# Patient Record
Sex: MALE | Race: WHITE | Employment: FULL TIME | URBAN - METROPOLITAN AREA
[De-identification: names, ages, dates, MRNs, and addresses within clinical notes are randomized per-mention and may not be internally consistent; named-entity substitution may affect disease eponyms.]

---

## 2018-06-15 ENCOUNTER — TRANSFERRED RECORDS (OUTPATIENT)
Dept: HEALTH INFORMATION MANAGEMENT | Facility: CLINIC | Age: 48
End: 2018-06-15

## 2019-01-15 ENCOUNTER — TRANSFERRED RECORDS (OUTPATIENT)
Dept: HEALTH INFORMATION MANAGEMENT | Facility: CLINIC | Age: 49
End: 2019-01-15

## 2019-02-05 ENCOUNTER — DOCUMENTATION ONLY (OUTPATIENT)
Dept: CARE COORDINATION | Facility: CLINIC | Age: 49
End: 2019-02-05

## 2019-02-22 NOTE — TELEPHONE ENCOUNTER
RECORDS RECEIVED FROM: Anderson Kovacs referring for SI joint pain, L side. Per Karolina - Xray 1/15/19, MRI Summer 2018 ([facility] - Pt also has disc he can hand-carry). Appt per Pt.   DATE RECEIVED: 02/22/19    NOTES STATUS DETAILS   OFFICE NOTE from referring provider Received Dr. Kovacs 1/15/19   OFFICE NOTE from other specialist N/A    DISCHARGE SUMMARY from hospital N/A    DISCHARGE REPORT from the ER N/A    OPERATIVE REPORT N/A    MEDICATION LIST Received    IMPLANT RECORD/STICKER N/A    LABS     CBC/DIFF N/A    CULTURES N/A    INJECTIONS DONE IN RADIOLOGY REceived 1/18/19   MRI In process Summer 2018, hand carry   CT SCAN N/A    XRAYS (IMAGES & REPORTS) Received 1/15/19   TUMOR     PATHOLOGY  Slides & report N/A      02/22/19  1:13 PM faxed records request to [facility] [phone]    02/25/19  2:16 PM Called [facility], [facility], called [facility]. [facility] Gr[facility] is pushing xray

## 2019-02-26 ENCOUNTER — PRE VISIT (OUTPATIENT)
Dept: ORTHOPEDICS | Facility: CLINIC | Age: 49
End: 2019-02-26

## 2019-03-22 DIAGNOSIS — M54.50 LOW BACK PAIN: Primary | ICD-10-CM

## 2019-03-22 ASSESSMENT — ENCOUNTER SYMPTOMS
HEADACHES: 1
WEAKNESS: 1
ORTHOPNEA: 0
SPEECH CHANGE: 0
MUSCLE WEAKNESS: 1
ALTERED TEMPERATURE REGULATION: 1
DECREASED APPETITE: 0
NECK PAIN: 0
SEIZURES: 0
STIFFNESS: 1
INCREASED ENERGY: 1
HYPOTENSION: 0
MEMORY LOSS: 1
LIGHT-HEADEDNESS: 0
DISTURBANCES IN COORDINATION: 0
JOINT SWELLING: 0
WEIGHT LOSS: 0
CHILLS: 0
WEIGHT GAIN: 1
NUMBNESS: 1
SYNCOPE: 0
POLYDIPSIA: 1
HYPERTENSION: 1
POLYPHAGIA: 1
TINGLING: 1
HALLUCINATIONS: 0
TREMORS: 0
SLEEP DISTURBANCES DUE TO BREATHING: 0
DIZZINESS: 0
MUSCLE CRAMPS: 0
FATIGUE: 1
LOSS OF CONSCIOUSNESS: 0
EXERCISE INTOLERANCE: 1
FEVER: 0
PALPITATIONS: 0
LEG PAIN: 1
MYALGIAS: 0
ARTHRALGIAS: 1
NIGHT SWEATS: 1
PARALYSIS: 0
BACK PAIN: 1

## 2019-03-26 ENCOUNTER — OFFICE VISIT (OUTPATIENT)
Dept: ORTHOPEDICS | Facility: CLINIC | Age: 49
End: 2019-03-26
Payer: COMMERCIAL

## 2019-03-26 ENCOUNTER — ANCILLARY PROCEDURE (OUTPATIENT)
Dept: CT IMAGING | Facility: CLINIC | Age: 49
End: 2019-03-26
Attending: PHYSICIAN ASSISTANT
Payer: COMMERCIAL

## 2019-03-26 ENCOUNTER — ANCILLARY PROCEDURE (OUTPATIENT)
Dept: GENERAL RADIOLOGY | Facility: CLINIC | Age: 49
End: 2019-03-26
Attending: ORTHOPAEDIC SURGERY
Payer: COMMERCIAL

## 2019-03-26 VITALS — WEIGHT: 315 LBS | HEIGHT: 72 IN | BODY MASS INDEX: 42.66 KG/M2

## 2019-03-26 DIAGNOSIS — M43.16 SPONDYLOLISTHESIS OF LUMBAR REGION: ICD-10-CM

## 2019-03-26 DIAGNOSIS — M54.50 LOW BACK PAIN: ICD-10-CM

## 2019-03-26 DIAGNOSIS — M53.3 SI (SACROILIAC) JOINT DYSFUNCTION: ICD-10-CM

## 2019-03-26 DIAGNOSIS — M53.3 SI (SACROILIAC) JOINT DYSFUNCTION: Primary | ICD-10-CM

## 2019-03-26 RX ORDER — LISINOPRIL 20 MG/1
TABLET ORAL
COMMUNITY
Start: 2019-01-30

## 2019-03-26 RX ORDER — BUPROPION HYDROCHLORIDE 150 MG/1
TABLET, EXTENDED RELEASE ORAL
COMMUNITY
Start: 2014-09-10

## 2019-03-26 RX ORDER — DULOXETIN HYDROCHLORIDE 60 MG/1
CAPSULE, DELAYED RELEASE ORAL
COMMUNITY
Start: 2009-10-21

## 2019-03-26 RX ORDER — PRAMIPEXOLE DIHYDROCHLORIDE 0.5 MG/1
TABLET ORAL
COMMUNITY
Start: 2012-04-26

## 2019-03-26 ASSESSMENT — MIFFLIN-ST. JEOR: SCORE: 2358.58

## 2019-03-26 NOTE — PROGRESS NOTES
REASON FOR CONSULTATION: Consult (SI joint pain)     REFERRING PHYSICIAN: Anderson Kovacs  PCP:Amee Dickerson    History of Present Illness: Patient presents today for evaluation of left sacroiliac joint pain.  He has been seeing Dr. Kovacs for workup of low back and SI joint pain.  He did have medial branch blocks and radiofrequency ablation at L4-5 which was very helpful for about 5 weeks.  He also had a fluoroscopy guided left sacroiliac joint injection on January 18, 2019.  On questioning that he had some significant relief for the first couple of hours then return to baseline for about a week and has had some 50% improvement in his symptomatology since then.  Walking distances worsen his pain and it does seem worse later in the day.  He has been off work as a  since last October short-term disability.  He also notes significant arthritis in the left greater than right hands.  He has radiating pain through the left gluteal and down his posterior left leg to the knee.  He has a regular tingling sensation in his left foot but never goes away.  This is been ongoing for 6-8 months.  He had an MRI done in [facility] and brings a disc with him today.  He does not recall any epidural type injections to his lumbar spine.      Back 80%, Leg 20%,  Left > Right  Worse: Standing or walking  Better: Sitting, laying down    Previous treatment:   The patient has seen another spine surgeon who recommended a 2 or 3 level discectomy.      Oswestry (CHRISTIE) Questionnaire    OSWESTRY DISABILITY INDEX 3/23/2019   Count 10   Sum 26   Oswestry Score (%) 52   Some recent data might be hidden        Visual Analog Pain Scale  Back Pain Scale 0-10: 2  Right leg pain: 0  Left leg pain: 2    PROMIS-10 Scores  Global Mental Health Score: (P) 12  Global Physical Health Score: (P) 10  PROMIS TOTAL - SUBSCORES: (P) 22    ROS:  A 12-point review of systems was completed and is negative except for otherwise  noted above in the history of present illness.    Med Hx:  No past medical history on file.    Surg Hx:  No past surgical history on file.    Allergies:  No Known Allergies    Meds:  Current Outpatient Medications   Medication     buPROPion (WELLBUTRIN SR) 150 MG 12 hr tablet     DULoxetine (CYMBALTA) 60 MG capsule     lisinopril (PRINIVIL/ZESTRIL) 20 MG tablet     pramipexole (MIRAPEX) 0.5 MG tablet     No current facility-administered medications for this visit.        Fam Hx:  No family history on file.    P/S Hx:  Social History     Tobacco Use     Smoking status: Not on file   Substance Use Topics     Alcohol use: Not on file         Physical Exam:  Very pleasant, healthy appearing, alert, oriented x 3, cooperative.  Normal mood and affect.  Not in cardiorespiratory distress.  Ht 1.829 m (6')   Wt 146.1 kg (322 lb)   BMI 43.67 kg/m    Normal upright posture.    Normal gait without assistive device.  No antalgia / imbalance.  Able to walk on toes and on heels with ease.  Back: no deformity, no skin lesions or surgical scars.  Localizes pain at left sided deep gluteal near SI joint  Tenderness: (-) midline, (-) paraspinal, (-) R and L PSIS.  ROM:   Full painless extension.   Full painless flexion, able to reach down to toes.     Neuro Exam: DTRs: 2+ at bilateral knees, unobtainable at bilateral Achilles.  Motor: 5/5 strength for all muscle groups in both LE's.  Sensory:  Intact to light touch in both LE's.   Reflexes:  Knee 2+ bilat.  Ankle 2+ bilat.  (-) Babinski, (-) clonus.    Lower Extremity:  Equal leg lengths, full pulses, (-) atrophy / asymmetry.  Full painless passive knee and ankle motion.  Straight leg raise: (-) right, (-) left.  Hip impingement: (-) right, (-) left.        Sacroiliac Joint Tests:      TEST RIGHT LEFT   PSIS tenderness - + mild   Star finger sign - +   FRANCOISE/Nicolás - +   Thigh thrust - -           Gapping + mild   Compression -   Sacral thrust -   Gaenslen's -            Imaging:  EOS lumbar ap-lat today shows lumbosacral transitional segmentation, with partially lumbarized S1 (referred to as L6).  (+) spondylotic changes L4-5 and L5-6, with hypolordosis. (+) compensatory hyperlordosis with spondylosis and retrolisthesis L3-4.  Sagittal measurements could be made by using either the upper or lower transitional endplate as S1; measurements as follows:  Using upper endplate as S1:  LL 49  L4-S1 25, ideal 30  PI 45  PI-LL mismatch -4  PT 12    Using lower endplate as S1:  LL 56   L4-S1 20, ideal 45  PI 68  PI-LL mismatch 12  PT 27    Pelvic CT scan from 3/26/2019 show a partially lumbarized S1, Walker the-Lnogoria type IV with pseudoarticulation on the right side and fusion between the enlarged transverse process and sacral ala on the left side.  There is some vacuum phenomenon in bilateral sacroiliac joints.  There are other features of sacral dysmorphism, such as upsloped sacral ala, prominent mammillary processes, and developed S1-2 disc space.    Lumbar CT from 3/26/2019 shows multilevel lumbar spondylosis, with significant facet arthrosis and vacuum phenomenon within the facet joints at L4-5 and L5-6 bilaterally.  There is osteophyte formation with retrolisthesis at L3-4.    Lumbar MRI from 12/21/2018 show spinal stenosis most severe at L4-5 followed by L3-4.  (+) degenerative disc changes L3-4,L4-5 and L5-S1; (+) Modic inflammatory endplate changes adjacent to L3-4 disc.    Lumbar MRI from 1/3/2018 show extruded disc herniation right posterior lateral L5-6.  This was already resolved and the subsequent MRI 11 months later.      Impression:   - Left sacroiliac joint pain.  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  - Spinal sagittal malalignment, with lower lumbar hypolordosis (L4-S1 = 25, ideal 30 [using upper transitional endplate as S1] OR L4-S1 = 20, ideal 45 [using lower endplate as S1]).  - Spinal  stenosis with claudication L4-5 and L3-4.  - Lumbar spondylosis L3-4,L4-5,L5-6, with retrolisthesis L3-4; resolved extruded R posterolateral HNP L5-6.      Plan:   We will obtain a CT of his pelvis and lumbar spine.  We will obtain an anesthetic only CT-guided sacroiliac joint injection on the left.  If his reaction to the SI injection is equivocal then we will consider a left transforaminal DIANA at L5-6.  And consider a possible fusion surgery.  If the sacroiliac injection gives him very significant relief then we will order physical therapy, sacroiliac focused.  If this is helpful, but not sufficient, we will consider sacroiliac joint fusion.    All questions and concerns were answered to the patient's apparent satisfaction before leaving the clinic.     Total visit time > 45 mins, > 50% counseling and coordination of care.    Respectfully,    Brayan Pagan PA-C    Attestation:  I (Dr. Mamadou Hilton - Spine Surgeon) have personally evaluated patient with PRESTON Pagan, and agree with findings and plan outlined in the note, which I also heavily edited.  I discussed at length with the patient/family, explained the nature of spinal condition, and formulated workup and/or treatment plan together.  All questions were answered to the best of my ability and to patient's apparent satisfaction.    48/m, referred by Dr. Kovacs of [facility] for SIJ pain.  However, I think his picture is a lot more complicated and is not a straightforward SIJ pain problem.  He has a lot of other things going on in his spine as well that could be contributing to or even perhaps the major cause of his symptoms.  Specifically he's got transitional lumbosacral segmentation with partially lumbarized S1 (referred to as L6), multilevel spondylosis, spinal stenosis, resolved extruded disc herniation, retrolisthesis, and sagittal malalignment.  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  At this point, it is not very clear to me how much of his pain is coming from his L SIJ vs coming from his spine.  He had a previous L SIJ injection that gave him some relief which he considers significant.    - Confirmatory CT-guided diagnostic L SIJB (anesthetic only).      If (+) may consider MIS L SIJ fusion      If (-) / equivocal/ or only mildly positive, will send for L L5-6 TFESI.  - Lumbar and pelvic CT - done; see above for findings.  - Advised to call re injection response.    RTC prn, with EOS full spine ap-lat x-rays and pelvic inlet-outlet-lat x-rays.  TT > 45 mins, > 50% CC.      Mamadou Hilton MD    Orthopaedic Spine Surgery  Dept Orthopaedic Surgery, [facility]  [phone] office, [phone] pager  [email or web address]    Answers for HPI/ROS submitted by the patient on 3/22/2019   General Symptoms: Yes  Skin Symptoms: No  HENT Symptoms: No  EYE SYMPTOMS: No  HEART SYMPTOMS: Yes  LUNG SYMPTOMS: No  INTESTINAL SYMPTOMS: No  URINARY SYMPTOMS: No  REPRODUCTIVE SYMPTOMS: No  SKELETAL SYMPTOMS: Yes  BLOOD SYMPTOMS: No  NERVOUS SYSTEM SYMPTOMS: Yes  MENTAL HEALTH SYMPTOMS: No  Fever: No  Loss of appetite: No  Weight loss: No  Weight gain: Yes  Fatigue: Yes  Night sweats: Yes  Chills: No  Increased stress: Yes  Excessive hunger: Yes  Excessive thirst: Yes  Feeling hot or cold when others believe the temperature is normal: Yes  Loss of height: No  Post-operative complications: No  Surgical site pain: No  Hallucinations: No  Change in or Loss of Energy: Yes  Hyperactivity: No  Confusion: No  Chest pain or pressure: No  Fast or irregular heartbeat: No  Pain in legs with walking: Yes  Trouble breathing while lying down: No  Fingers or toes appear blue: No  High blood pressure: Yes  Low blood pressure: No  Fainting: No  Murmurs: No  Pacemaker: No  Varicose veins: No  Edema or swelling: No  Wake up at night with  shortness of breath: No  Light-headedness: No  Exercise intolerance: Yes  Back pain: Yes  Muscle aches: No  Neck pain: No  Swollen joints: No  Joint pain: Yes  Bone pain: Yes  Muscle cramps: No  Muscle weakness: Yes  Joint stiffness: Yes  Bone fracture: No  Trouble with coordination: No  Dizziness or trouble with balance: No  Fainting or black-out spells: No  Memory loss: Yes  Headache: Yes  Seizures: No  Speech problems: No  Tingling: Yes  Tremor: No  Weakness: Yes  Difficulty walking: No  Paralysis: No  Numbness: Yes

## 2019-03-26 NOTE — NURSING NOTE
Reason For Visit:   Chief Complaint   Patient presents with     Consult     SI joint pain       Primary MD: Ottoniel Hope    ? No  Occupation Short term disability from back and SI joint    Date of injury: None  Date of surgery: None  Smoker: No, chews tobacco    Ht 1.829 m (6')   Wt 146.1 kg (322 lb)   BMI 43.67 kg/m      Pain Assessment  Patient Currently in Pain: Yes  0-10 Pain Scale: 4  Primary Pain Location: Buttocks  Pain Descriptors: Aching, Discomfort    Oswestry (CHRISTIE) Questionnaire    OSWESTRY DISABILITY INDEX 3/23/2019   Count 10   Sum 26   Oswestry Score (%) 52   Some recent data might be hidden        Visual Analog Pain Scale  Back Pain Scale 0-10: 2  Right leg pain: 0  Left leg pain: 2    Promis 10 Assessment    PROMIS 10 3/22/2019   In general, would you say your health is: Fair   In general, would you say your quality of life is: Fair   In general, how would you rate your physical health? Fair   In general, how would you rate your mental health, including your mood and your ability to think? Very good   In general, how would you rate your satisfaction with your social activities and relationships? Fair   In general, please rate how well you carry out your usual social activities and roles Good   To what extent are you able to carry out your everyday physical activities such as walking, climbing stairs, carrying groceries, or moving a chair? Moderately   How often have you been bothered by emotional problems such as feeling anxious, depressed or irritable? Rarely   How would you rate your fatigue on average? Severe   How would you rate your pain on average?   0 = No Pain  to  10 = Worst Imaginable Pain 4   In general, would you say your health is: 2   In general, would you say your quality of life is: 2   In general, how would you rate your physical health? 2   In general, how would you rate your mental health, including your mood and your ability to think? 4   In general, how would you  rate your satisfaction with your social activities and relationships? 2   In general, please rate how well you carry out your usual social activities and roles. (This includes activities at home, at work and in your community, and responsibilities as a parent, child, spouse, employee, friend, etc.) 3   To what extent are you able to carry out your everyday physical activities such as walking, climbing stairs, carrying groceries, or moving a chair? 3   In the past 7 days, how often have you been bothered by emotional problems such as feeling anxious, depressed, or irritable? 2   In the past 7 days, how would you rate your fatigue on average? 4   In the past 7 days, how would you rate your pain on average, where 0 means no pain, and 10 means worst imaginable pain? 4   Global Mental Health Score 12   Global Physical Health Score 10   PROMIS TOTAL - SUBSCORES 22   Some recent data might be hidden                Glendy Sotelo LPN

## 2019-03-26 NOTE — LETTER
3/26/2019       RE: Mike Alcantara  [address]  [place] [address]     Dear Colleague,    Thank you for referring your patient, Mike Alcantara, to [facility]. Please see a copy of my visit note below.    REASON FOR CONSULTATION: Consult (SI joint pain)     REFERRING PHYSICIAN: Anderson Kovacs  PCP:Amee Dickerson    History of Present Illness: Patient presents today for evaluation of left sacroiliac joint pain.  He has been seeing Dr. Kovacs for workup of low back and SI joint pain.  He did have medial branch blocks and radiofrequency ablation at L4-5 which was very helpful for about 5 weeks.  He also had a fluoroscopy guided left sacroiliac joint injection on January 18, 2019.  On questioning that he had some significant relief for the first couple of hours then return to baseline for about a week and has had some 50% improvement in his symptomatology since then.  Walking distances worsen his pain and it does seem worse later in the day.  He has been off work as a  since last October short-term disability.  He also notes significant arthritis in the left greater than right hands.  He has radiating pain through the left gluteal and down his posterior left leg to the knee.  He has a regular tingling sensation in his left foot but never goes away.  This is been ongoing for 6-8 months.  He had an MRI done in [facility] and brings a disc with him today.  He does not recall any epidural type injections to his lumbar spine.      Back 80%, Leg 20%,  Left > Right  Worse: Standing or walking  Better: Sitting, laying down    Previous treatment:   The patient has seen another spine surgeon who recommended a 2 or 3 level discectomy.      Oswestry (CHRISTIE) Questionnaire    OSWESTRY DISABILITY INDEX 3/23/2019   Count 10   Sum 26   Oswestry Score (%) 52   Some recent data might be hidden        Visual Analog  Pain Scale  Back Pain Scale 0-10: 2  Right leg pain: 0  Left leg pain: 2    PROMIS-10 Scores  Global Mental Health Score: (P) 12  Global Physical Health Score: (P) 10  PROMIS TOTAL - SUBSCORES: (P) 22    ROS:  A 12-point review of systems was completed and is negative except for otherwise noted above in the history of present illness.    Med Hx:  No past medical history on file.    Surg Hx:  No past surgical history on file.    Allergies:  No Known Allergies    Meds:  Current Outpatient Medications   Medication     buPROPion (WELLBUTRIN SR) 150 MG 12 hr tablet     DULoxetine (CYMBALTA) 60 MG capsule     lisinopril (PRINIVIL/ZESTRIL) 20 MG tablet     pramipexole (MIRAPEX) 0.5 MG tablet     No current facility-administered medications for this visit.        Fam Hx:  No family history on file.    P/S Hx:  Social History     Tobacco Use     Smoking status: Not on file   Substance Use Topics     Alcohol use: Not on file         Physical Exam:  Very pleasant, healthy appearing, alert, oriented x 3, cooperative.  Normal mood and affect.  Not in cardiorespiratory distress.  Ht 1.829 m (6')   Wt 146.1 kg (322 lb)   BMI 43.67 kg/m     Normal upright posture.    Normal gait without assistive device.  No antalgia / imbalance.  Able to walk on toes and on heels with ease.  Back: no deformity, no skin lesions or surgical scars.  Localizes pain at left sided deep gluteal near SI joint  Tenderness: (-) midline, (-) paraspinal, (-) R and L PSIS.  ROM:   Full painless extension.   Full painless flexion, able to reach down to toes.     Neuro Exam: DTRs: 2+ at bilateral knees, unobtainable at bilateral Achilles.  Motor: 5/5 strength for all muscle groups in both LE's.  Sensory:  Intact to light touch in both LE's.   Reflexes:  Knee 2+ bilat.  Ankle 2+ bilat.  (-) Babinski, (-) clonus.    Lower Extremity:  Equal leg lengths, full pulses, (-) atrophy / asymmetry.  Full painless passive knee and ankle motion.  Straight leg raise: (-)  right, (-) left.  Hip impingement: (-) right, (-) left.        Sacroiliac Joint Tests:      TEST RIGHT LEFT   PSIS tenderness - + mild   Star finger sign - +   FRANCOISE/Nicolás - +   Thigh thrust - -           Gapping + mild   Compression -   Sacral thrust -   Gaenslen's -           Imaging:  EOS lumbar ap-lat today shows lumbosacral transitional segmentation, with partially lumbarized S1 (referred to as L6).  (+) spondylotic changes L4-5 and L5-6, with hypolordosis. (+) compensatory hyperlordosis with spondylosis and retrolisthesis L3-4.  Sagittal measurements could be made by using either the upper or lower transitional endplate as S1; measurements as follows:  Using upper endplate as S1:  LL 49  L4-S1 25, ideal 30  PI 45  PI-LL mismatch -4  PT 12    Using lower endplate as S1:  LL 56   L4-S1 20, ideal 45  PI 68  PI-LL mismatch 12  PT 27    Pelvic CT scan from 3/26/2019 show a partially lumbarized S1, Walker the-Longoria type IV with pseudoarticulation on the right side and fusion between the enlarged transverse process and sacral ala on the left side.  There is some vacuum phenomenon in bilateral sacroiliac joints.  There are other features of sacral dysmorphism, such as upsloped sacral ala, prominent mammillary processes, and developed S1-2 disc space.    Lumbar CT from 3/26/2019 shows multilevel lumbar spondylosis, with significant facet arthrosis and vacuum phenomenon within the facet joints at L4-5 and L5-6 bilaterally.  There is osteophyte formation with retrolisthesis at L3-4.    Lumbar MRI from 12/21/2018 show spinal stenosis most severe at L4-5 followed by L3-4.  (+) degenerative disc changes L3-4,L4-5 and L5-S1; (+) Modic inflammatory endplate changes adjacent to L3-4 disc.    Lumbar MRI from 1/3/2018 show extruded disc herniation right posterior lateral L5-6.  This was already resolved and the subsequent MRI 11 months later.      Impression:   - Left sacroiliac joint pain.  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  - Spinal sagittal malalignment, with lower lumbar hypolordosis (L4-S1 = 25, ideal 30 [using upper transitional endplate as S1] OR L4-S1 = 20, ideal 45 [using lower endplate as S1]).  - Spinal stenosis with claudication L4-5 and L3-4.  - Lumbar spondylosis L3-4,L4-5,L5-6, with retrolisthesis L3-4; resolved extruded R posterolateral HNP L5-6.      Plan:   We will obtain a CT of his pelvis and lumbar spine.  We will obtain an anesthetic only CT-guided sacroiliac joint injection on the left.  If his reaction to the SI injection is equivocal then we will consider a left transforaminal DIANA at L5-6.  And consider a possible fusion surgery.  If the sacroiliac injection gives him very significant relief then we will order physical therapy, sacroiliac focused.  If this is helpful, but not sufficient, we will consider sacroiliac joint fusion.    All questions and concerns were answered to the patient's apparent satisfaction before leaving the clinic.     Total visit time > 45 mins, > 50% counseling and coordination of care.    Respectfully,    Brayan Pagan PA-C    Attestation:  I (Dr. Mamadou Hilton - Spine Surgeon) have personally evaluated patient with PRESTON Pagan, and agree with findings and plan outlined in the note, which I also heavily edited.  I discussed at length with the patient/family, explained the nature of spinal condition, and formulated workup and/or treatment plan together.  All questions were answered to the best of my ability and to patient's apparent satisfaction.    48/m, referred by Dr. Kovacs of [facility] for SIJ pain.  However, I think his picture is a lot more complicated and is not a straightforward SIJ pain problem.  He has a lot of other things going on in his spine as well that could be contributing to or even perhaps the major cause of his symptoms.  Specifically he's got transitional lumbosacral  segmentation with partially lumbarized S1 (referred to as L6), multilevel spondylosis, spinal stenosis, resolved extruded disc herniation, retrolisthesis, and sagittal malalignment.  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  At this point, it is not very clear to me how much of his pain is coming from his L SIJ vs coming from his spine.  He had a previous L SIJ injection that gave him some relief which he considers significant.    - Confirmatory CT-guided diagnostic L SIJB (anesthetic only).      If (+) may consider MIS L SIJ fusion      If (-) / equivocal/ or only mildly positive, will send for L L5-6 TFESI.  - Lumbar and pelvic CT - done; see above for findings.  - Advised to call re injection response.    RTC prn, with EOS full spine ap-lat x-rays and pelvic inlet-outlet-lat x-rays.  TT > 45 mins, > 50% CC.        Again, thank you for allowing me to participate in the care of your patient.      Sincerely,    Mamadou Hilton MD

## 2019-03-28 ENCOUNTER — TELEPHONE (OUTPATIENT)
Dept: ORTHOPEDICS | Facility: CLINIC | Age: 49
End: 2019-03-28

## 2019-03-28 NOTE — TELEPHONE ENCOUNTER
[facility] Call Center    Phone Message    May a detailed message be left on voicemail: yes    Reason for Call: Order(s): SI Joint Injection  Reason for requested: Order says bilateral, however note states left.  Please call to verbally clarify and refax new order to [phone]  Date needed: asap  Provider name: Dr. Hilton      Action Taken: Message routed to:  [facility] ([facility] ump ortho

## 2019-03-29 ENCOUNTER — HOSPITAL ENCOUNTER (OUTPATIENT)
Dept: GENERAL RADIOLOGY | Facility: OTHER | Age: 49
Discharge: HOME | End: 2019-03-29
Attending: PHYSICIAN ASSISTANT | Admitting: FAMILY MEDICINE
Payer: COMMERCIAL

## 2019-03-29 DIAGNOSIS — M53.3 SI (SACROILIAC) JOINT DYSFUNCTION: ICD-10-CM

## 2019-03-29 PROCEDURE — 25500064 ZZH RX 255 OP 636: Performed by: RADIOLOGY

## 2019-03-29 PROCEDURE — 27096 INJECT SACROILIAC JOINT: CPT | Mod: LT

## 2019-03-29 PROCEDURE — 25000125 ZZHC RX 250: Performed by: RADIOLOGY

## 2019-03-29 PROCEDURE — 25000128 H RX IP 250 OP 636: Performed by: RADIOLOGY

## 2019-03-29 RX ORDER — BUPIVACAINE HYDROCHLORIDE 5 MG/ML
1 INJECTION, SOLUTION PERINEURAL ONCE
Status: COMPLETED | OUTPATIENT
Start: 2019-03-29 | End: 2019-03-29

## 2019-03-29 RX ADMIN — Medication 2 ML: at 15:46

## 2019-03-29 RX ADMIN — BUPIVACAINE HYDROCHLORIDE 5 MG: 5 INJECTION, SOLUTION PERINEURAL at 15:46

## 2019-03-29 RX ADMIN — LIDOCAINE HYDROCHLORIDE 3 ML: 10 INJECTION, SOLUTION INFILTRATION; PERINEURAL at 15:46

## 2019-03-29 NOTE — TELEPHONE ENCOUNTER
Called pt. I see he has an injection at [facility] set up but [facility] is calling.     Want to know where pt is being seen for this.       Glendy

## 2019-03-29 NOTE — TELEPHONE ENCOUNTER
Pt called back and verified location    Called [facility] to verify they have order correct.     Glendy

## 2019-03-29 NOTE — TELEPHONE ENCOUNTER
[facility] Call Center    Phone Message    May a detailed message be left on voicemail: yes    Reason for Call: Other: Pt returned call to coreen Pike requesting call back     Action Taken: Message routed to:  [facility] ([facility]): ortho

## 2019-04-06 ENCOUNTER — MYC MEDICAL ADVICE (OUTPATIENT)
Dept: ORTHOPEDICS | Facility: CLINIC | Age: 49
End: 2019-04-06

## 2019-04-06 DIAGNOSIS — M46.1 SACROILIITIS (H): Primary | ICD-10-CM

## 2019-04-08 NOTE — TELEPHONE ENCOUNTER
See My Chart message about result of SI JOint injection done at .  See report stating 90% relief of pain.  Per dictation states if relief of pain then try SI Joint specific therapy to see if it helps & then RTC apt. To decide next step.  I called pt who agreed  with plan & understands therapy must be with one of our specific SI Joint therapists either Petra or Tayo [phone] & he will schedule.  PT ordered.  Call back prn. He agreed.  VARGASO./Marcia Barry RN,.

## 2019-11-04 ENCOUNTER — HEALTH MAINTENANCE LETTER (OUTPATIENT)
Age: 49
End: 2019-11-04

## 2020-11-16 ENCOUNTER — HEALTH MAINTENANCE LETTER (OUTPATIENT)
Age: 50
End: 2020-11-16

## 2021-09-18 ENCOUNTER — HEALTH MAINTENANCE LETTER (OUTPATIENT)
Age: 51
End: 2021-09-18

## 2022-01-08 ENCOUNTER — HEALTH MAINTENANCE LETTER (OUTPATIENT)
Age: 52
End: 2022-01-08

## 2022-11-20 ENCOUNTER — HEALTH MAINTENANCE LETTER (OUTPATIENT)
Age: 52
End: 2022-11-20

## 2023-04-15 ENCOUNTER — HEALTH MAINTENANCE LETTER (OUTPATIENT)
Age: 53
End: 2023-04-15

## (undated) RX ORDER — BUPIVACAINE HYDROCHLORIDE 5 MG/ML
INJECTION, SOLUTION EPIDURAL; INTRACAUDAL
Status: DISPENSED
Start: 2019-03-29

## (undated) RX ORDER — LIDOCAINE HYDROCHLORIDE 10 MG/ML
INJECTION, SOLUTION INFILTRATION; PERINEURAL
Status: DISPENSED
Start: 2019-03-29